# Patient Record
Sex: MALE | Employment: STUDENT | URBAN - METROPOLITAN AREA
[De-identification: names, ages, dates, MRNs, and addresses within clinical notes are randomized per-mention and may not be internally consistent; named-entity substitution may affect disease eponyms.]

---

## 2017-07-24 ENCOUNTER — OFFICE VISIT (OUTPATIENT)
Dept: INTERNAL MEDICINE CLINIC | Facility: CLINIC | Age: 37
End: 2017-07-24

## 2017-07-24 ENCOUNTER — HOSPITAL ENCOUNTER (EMERGENCY)
Facility: HOSPITAL | Age: 37
Discharge: LEFT BEFORE DISCHARGE | End: 2017-07-24

## 2017-07-24 VITALS
DIASTOLIC BLOOD PRESSURE: 86 MMHG | SYSTOLIC BLOOD PRESSURE: 135 MMHG | WEIGHT: 144.5 LBS | HEART RATE: 67 BPM | BODY MASS INDEX: 25 KG/M2

## 2017-07-24 DIAGNOSIS — N23 RENAL COLIC: Primary | ICD-10-CM

## 2017-07-24 PROCEDURE — 99212 OFFICE O/P EST SF 10 MIN: CPT | Performed by: INTERNAL MEDICINE

## 2017-07-24 PROCEDURE — 99213 OFFICE O/P EST LOW 20 MIN: CPT | Performed by: INTERNAL MEDICINE

## 2017-07-25 ENCOUNTER — TELEPHONE (OUTPATIENT)
Dept: INTERNAL MEDICINE CLINIC | Facility: CLINIC | Age: 37
End: 2017-07-25

## 2017-07-25 DIAGNOSIS — R10.9 FLANK PAIN: Primary | ICD-10-CM

## 2017-07-25 NOTE — TELEPHONE ENCOUNTER
Pt was transferred to me on the agent line. States that she had already spoken to a RN in Dr. Diamond Avalos office. Was on hold for that RN. I do not see any documentation that she spoke to a RN. Pt wanted to be transferred back to Dr. Diamond Avalos office.   I put

## 2017-07-25 NOTE — TELEPHONE ENCOUNTER
Mother calling requesting to speak to Dr or nurse regarding 07/24 appt and medication. No more information given.

## 2017-07-25 NOTE — TELEPHONE ENCOUNTER
Pt mother called office and pt  verified. Pt mother states pt saw Dr Norberto Valente yesterday and pt wanted pain medication. Dr Norberto Valente sent pt to ER and they were told in the ER there was an extensive wait. Pt did not want to wait to be seen and left.     Per P

## 2017-07-25 NOTE — PROGRESS NOTES
Davida Maddox is a 40year old male. Patient presents with:  Back Pain    HPI:   Libia Villavicencio presents tonight, accompanied by his mother, for evaluation. Since this afternoon, he has had constant sharp right-sided low and mid back pain. Pain is severe.   Dur requesting only a prescription for pain medication. Discussed with them that I think it is important to identify the cause of his pain in an expeditious fashion. After discussion, they will proceed to the ER now.       The patient indicates understanding

## 2017-07-25 NOTE — TELEPHONE ENCOUNTER
As I discussed at our visit last night, I do not feel comfortable prescribing narcotic analgesic pain medication without knowing the cause of his severe back pain.   I am glad that he is feeling better, but if pain recurs, he should go to the ER for evaluat

## 2017-07-26 NOTE — TELEPHONE ENCOUNTER
Okay to order CT scan stat. I am not sure why he continues to remain reluctant to go to the ER despite his severe pain, especially since diagnosis is to this point unknown.

## 2017-07-26 NOTE — TELEPHONE ENCOUNTER
Chart reviewed, no response from manage care regarding if preauth. is needed for Stat CT of Kidney. I called [facility], spoke with 4 different staff and transferred each time.  Finally Kulwinder Kellogg stated no RQI required for this test. Pt can pr

## 2017-07-26 NOTE — TELEPHONE ENCOUNTER
I called back and spoke with pt directly this time. Stated right flank pain is a 7 and nonradiating. Vomited once this morning. No fever. Last void at 6am today. Pt irritated and rude stated \"Why are you asking so many questions? \" I advised pt to go to E

## 2017-07-26 NOTE — TELEPHONE ENCOUNTER
Dr Ruben Rome,    The CT Kidney order if pended for md review and signoff. Is this without IV contrast? Also, what dx code to use back pain or flank pain?

## 2017-07-26 NOTE — TELEPHONE ENCOUNTER
Stat CT Kidney generated and sent to manage care dept. [facility] pt is waiting for call once this is approved. Call Kaylin Santamaria (mother) or pt at cell [phone]. Thank you.

## 2017-07-26 NOTE — TELEPHONE ENCOUNTER
Angel Wendy (mother) called to follow up on MD orders. Stated \"pt in a lot of back pain. \"       Actions Requested: Requesting diagnostic order. Problem: Right flank pain. Onset and Timing: 3 days +  Associated Symptoms: Pt heard screaming in background.   Aggr

## 2017-07-26 NOTE — TELEPHONE ENCOUNTER
Pt's mother stts pt already went to ER on Monday night but the wait was too long and he couldn't sit that long with his back issue. Wondering if they can have an X-Ray or see a specialist instead.

## 2017-07-27 NOTE — TELEPHONE ENCOUNTER
[facility] dept does not process STAT CT. Clinical staff has to obtain prior auth for STAT testing. [facility] dept only process routine status testing that has a turn a round time of 10-14 business days. Thank you, [facility].

## 2018-05-08 ENCOUNTER — OFFICE VISIT (OUTPATIENT)
Dept: INTERNAL MEDICINE CLINIC | Facility: CLINIC | Age: 38
End: 2018-05-08

## 2018-05-08 VITALS
SYSTOLIC BLOOD PRESSURE: 124 MMHG | BODY MASS INDEX: 26.58 KG/M2 | HEIGHT: 63 IN | DIASTOLIC BLOOD PRESSURE: 80 MMHG | WEIGHT: 150 LBS | RESPIRATION RATE: 18 BRPM | TEMPERATURE: 98 F | HEART RATE: 85 BPM

## 2018-05-08 DIAGNOSIS — J06.9 UPPER RESPIRATORY TRACT INFECTION, UNSPECIFIED TYPE: Primary | ICD-10-CM

## 2018-05-08 PROCEDURE — 99213 OFFICE O/P EST LOW 20 MIN: CPT | Performed by: PHYSICIAN ASSISTANT

## 2018-05-08 RX ORDER — AZITHROMYCIN 250 MG/1
TABLET, FILM COATED ORAL
Qty: 6 TABLET | Refills: 0 | Status: SHIPPED | OUTPATIENT
Start: 2018-05-08 | End: 2020-05-21

## 2018-05-08 RX ORDER — HYDROCODONE BITARTRATE AND ACETAMINOPHEN 5; 325 MG/1; MG/1
TABLET ORAL
COMMUNITY
Start: 2014-01-24 | End: 2018-05-08 | Stop reason: ALTCHOICE

## 2018-05-08 RX ORDER — FLUTICASONE PROPIONATE 50 MCG
2 SPRAY, SUSPENSION (ML) NASAL DAILY
Qty: 1 BOTTLE | Refills: 0 | Status: SHIPPED | OUTPATIENT
Start: 2018-05-08

## 2018-05-08 NOTE — PROGRESS NOTES
HPI:    Patient ID: Jessi Song is a 40year old male. HPI   Patient presents today complaining of upper respiratory symptoms that started 5 days ago. 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   He has been experiencing cough productive of turbinates. Post nasal drip. Eyes: Conjunctivae are normal. Pupils are equal, round, and reactive to light. Neck: Normal range of motion. Neck supple. Cardiovascular: Normal rate, regular rhythm and normal heart sounds.     Pulmonary/Chest: Effort no

## 2018-05-08 NOTE — PATIENT INSTRUCTIONS
Take Azithromycin - two tablets today, then one tablet daily until finished  Drink plenty of fluids to stay hydrated - at least 2 liters per day   Wash hands frequently, cover your cough or sneeze, do not share drinks with others.   Humidifier, steam, and V